# Patient Record
Sex: MALE
[De-identification: names, ages, dates, MRNs, and addresses within clinical notes are randomized per-mention and may not be internally consistent; named-entity substitution may affect disease eponyms.]

---

## 2021-09-30 ENCOUNTER — HOSPITAL ENCOUNTER (EMERGENCY)
Dept: HOSPITAL 5 - ED | Age: 51
LOS: 1 days | End: 2021-10-01
Payer: SELF-PAY

## 2021-09-30 VITALS — DIASTOLIC BLOOD PRESSURE: 37 MMHG | SYSTOLIC BLOOD PRESSURE: 86 MMHG

## 2021-09-30 DIAGNOSIS — N19: ICD-10-CM

## 2021-09-30 DIAGNOSIS — I46.9: ICD-10-CM

## 2021-09-30 DIAGNOSIS — D64.9: ICD-10-CM

## 2021-09-30 DIAGNOSIS — G93.40: Primary | ICD-10-CM

## 2021-09-30 DIAGNOSIS — N49.3: ICD-10-CM

## 2021-09-30 DIAGNOSIS — E87.2: ICD-10-CM

## 2021-09-30 DIAGNOSIS — J90: ICD-10-CM

## 2021-09-30 DIAGNOSIS — R65.20: ICD-10-CM

## 2021-09-30 DIAGNOSIS — K72.90: ICD-10-CM

## 2021-09-30 LAB
ALBUMIN SERPL-MCNC: 2.1 G/DL (ref 3.9–5)
ALT SERPL-CCNC: 69 UNITS/L (ref 7–56)
ANISOCYTOSIS BLD QL SMEAR: (no result)
APTT BLD: 55.9 SEC. (ref 24.2–36.6)
BAND NEUTROPHILS # (MANUAL): 0.3 K/MM3
BUN SERPL-MCNC: 75 MG/DL (ref 9–20)
BUN/CREAT SERPL: 23 %
CALCIUM SERPL-MCNC: 8.4 MG/DL (ref 8.4–10.2)
HCT VFR BLD CALC: 16.2 % (ref 35.5–45.6)
HEMOLYSIS INDEX: 0
HGB BLD-MCNC: 5.2 GM/DL (ref 11.8–15.2)
INR PPP: 3.51 (ref 0.87–1.13)
MACROCYTES BLD QL SMEAR: (no result)
MCHC RBC AUTO-ENTMCNC: 32 % (ref 32–34)
MCV RBC AUTO: 127 FL (ref 84–94)
MYELOCYTES # (MANUAL): 0 K/MM3
PLATELET # BLD: 186 K/MM3 (ref 140–440)
PROMYELOCYTES # (MANUAL): 0 K/MM3
RBC # BLD AUTO: 1.28 M/MM3 (ref 3.65–5.03)
TOTAL CELLS COUNTED BLD: 100

## 2021-09-30 PROCEDURE — 96365 THER/PROPH/DIAG IV INF INIT: CPT

## 2021-09-30 PROCEDURE — 85730 THROMBOPLASTIN TIME PARTIAL: CPT

## 2021-09-30 PROCEDURE — 36415 COLL VENOUS BLD VENIPUNCTURE: CPT

## 2021-09-30 PROCEDURE — 71045 X-RAY EXAM CHEST 1 VIEW: CPT

## 2021-09-30 PROCEDURE — 86901 BLOOD TYPING SEROLOGIC RH(D): CPT

## 2021-09-30 PROCEDURE — 96366 THER/PROPH/DIAG IV INF ADDON: CPT

## 2021-09-30 PROCEDURE — 92950 HEART/LUNG RESUSCITATION CPR: CPT

## 2021-09-30 PROCEDURE — 86850 RBC ANTIBODY SCREEN: CPT

## 2021-09-30 PROCEDURE — 36680 INSERT NEEDLE BONE CAVITY: CPT

## 2021-09-30 PROCEDURE — 99292 CRITICAL CARE ADDL 30 MIN: CPT

## 2021-09-30 PROCEDURE — 82140 ASSAY OF AMMONIA: CPT

## 2021-09-30 PROCEDURE — 86920 COMPATIBILITY TEST SPIN: CPT

## 2021-09-30 PROCEDURE — 86900 BLOOD TYPING SEROLOGIC ABO: CPT

## 2021-09-30 PROCEDURE — 70450 CT HEAD/BRAIN W/O DYE: CPT

## 2021-09-30 PROCEDURE — 99291 CRITICAL CARE FIRST HOUR: CPT

## 2021-09-30 PROCEDURE — 80053 COMPREHEN METABOLIC PANEL: CPT

## 2021-09-30 PROCEDURE — 85610 PROTHROMBIN TIME: CPT

## 2021-09-30 PROCEDURE — 96367 TX/PROPH/DG ADDL SEQ IV INF: CPT

## 2021-09-30 PROCEDURE — 87040 BLOOD CULTURE FOR BACTERIA: CPT

## 2021-09-30 PROCEDURE — 85007 BL SMEAR W/DIFF WBC COUNT: CPT

## 2021-09-30 PROCEDURE — 85025 COMPLETE CBC W/AUTO DIFF WBC: CPT

## 2021-09-30 PROCEDURE — 96368 THER/DIAG CONCURRENT INF: CPT

## 2021-09-30 NOTE — XRAY REPORT
CHEST 1 VIEW 9/30/2021 8:48 PM



INDICATION / CLINICAL INFORMATION: AMS.



COMPARISON: None available.



FINDINGS:



SUPPORT DEVICES: None.

HEART / MEDIASTINUM: No significant abnormality. 

LUNGS / PLEURA: There is a moderate right pleural effusion with right basilar opacification. No pneum
othorax. 



ADDITIONAL FINDINGS: No significant additional findings.



IMPRESSION:

1. Moderate right pleural effusion with right basilar opacification. Underlying infectious process is
 not excluded.



Signer Name: Mumtaz Garnett DO 

Signed: 9/30/2021 9:11 PM

Workstation Name: [id number]

## 2021-10-01 NOTE — CAT SCAN REPORT
CT HEAD WITHOUT CONTRAST



INDICATION: Altered Mental Status / Fall, cardiopulmonary arrest immediately following scan, patient 
now 



TECHNIQUE: All CT scans at this location are performed using CT dose reduction for ALARA by means of 
automated exposure control. 



COMPARISON: None available.



FINDINGS:



BRAIN: No hemorrhage or mass effect are seen. No evidence of acute infarction is noted. Mild atrophic
 changes are seen which are disproportionate to age.



ORBITS: Normal as visualized.

SOFT TISSUES OF HEAD: Normal.

CALVARIUM: Normal.

VISUALIZED PARANASAL SINUSES AND MASTOID AIR CELLS: Clear.



ADDITIONAL FINDINGS: None.



IMPRESSION: No acute intracranial abnormality.



Signer Name: Pedro Long MD 

Signed: 10/1/2021 12:41 AM

Workstation Name: [id number]

## 2021-10-01 NOTE — EMERGENCY DEPARTMENT REPORT
ED Altered Mental Status HPI





- General


Chief Complaint: Altered Mental Status


Stated Complaint: HERNIA


Time Seen by Provider: 21 20:41


Source: EMS


Mode of arrival: Stretcher


Limitations: Altered Mental Status





- History of Present Illness


Initial Comments: 





51-year-old male, unknown past medical history, presents to ED with altered 

mental status.  Apparently EMS was called by patient's friend for altered mental

status.  Only history that was given by the friend is that patient has a 

multiyear history of right inguinal hernia.  EMS reports patient was in unkempt 

conditions.  Patient hypotensive.  Patient is altered and unable to give any 

history.  


MD Complaint: altered mental status


-: unknown


Severity: severe


Consistency of Symptoms: constant


Treatments Prior to Arrival: IV fluid





- Related Data


                                    Allergies











Allergy/AdvReac Type Severity Reaction Status Date / Time


 


No Known Drug Allergies Allergy  Unknown Verified 21 20:56














ED Review of Systems


ROS: 


Stated complaint: HERNIA


Other details as noted in HPI





Comment: Unobtainable due to pts medical conditions





ED Past Medical Hx





- Past Medical History


Previous Medical History?: No





- Surgical History


Past Surgical History?: No





- Social History


Smoking Status: Unknown if ever smoked


Substance Use Type: None





ED Physical Exam





- General


Limitations: Altered Mental Status


General appearance: obtunded





- Head


Head exam: Present: atraumatic, normocephalic





- Eye


Eye exam: Present: PERRL, EOMI, scleral icterus





- ENT


ENT exam: Present: mucous membranes dry





- Neck


Neck exam: Present: normal inspection





- Respiratory


Respiratory exam: Present: normal lung sounds bilaterally, respiratory distress,

other (Tachypnea)





- Cardiovascular


Cardiovascular Exam: Present: normal rhythm, tachycardia





- GI/Abdominal


GI/Abdominal exam: Present: soft.  Absent: distended





- 


 exam: Present: other (Severe swelling to right scrotum, slightly larger than 

a football, with areas that appear to be gangrenous and purulent discharge 

present)





- Extremities Exam


Extremities exam: Present: other (Edema present to bilateral lower extremities, 

right greater than left)





- Neurological Exam


Neurological exam: Present: altered





- Psychiatric


Psychiatric exam: Present: flat affect





- Skin


Skin exam: Present: warm, dry, other (Severely jaundiced)





ED Course


                                   Vital Signs











  21





  20:50 20:55 21:02


 


Temperature  97.0 F L 


 


Pulse Rate 112 H  108 H


 


Respiratory 20  31 H





Rate   


 


Blood Pressure   


 


O2 Sat by Pulse   97





Oximetry   














  21





  21:07 21:16 21:30


 


Temperature   


 


Pulse Rate  103 H 104 H


 


Respiratory  26 H 32 H





Rate   


 


Blood Pressure  73/31 86/24


 


O2 Sat by Pulse 98 99 100





Oximetry   














  21





  21:45 22:00 22:15


 


Temperature   


 


Pulse Rate 104 H 105 H 102 H


 


Respiratory 28 H 31 H 32 H





Rate   


 


Blood Pressure 78/23 82/25 84/33


 


O2 Sat by Pulse 100 100 100





Oximetry   














  21





  22:30 22:46 23:00


 


Temperature   


 


Pulse Rate  97 H 90


 


Respiratory 35 H 34 H 28 H





Rate   


 


Blood Pressure 76/27 81/27 75/27


 


O2 Sat by Pulse 100 100 100





Oximetry   














  21





  23:16


 


Temperature 


 


Pulse Rate 92 H


 


Respiratory 28 H





Rate 


 


Blood Pressure 86/37


 


O2 Sat by Pulse 90





Oximetry 














- Reevaluation(s)


Reevaluation #1: 





21  23:22


Had extensive conversation with the brother Praneeth Stubbs and family friend 

Rick Celeste here in the ED. Rick is the friend that called 911 tonight.  They 

both report that patient had longstanding history of alcohol abuse.  It is also 

reported that patient was urged by the both of them to seek medical attention 

for his hernia and declining health, however patient refused.  Patient had not 

seen a physician in quite some time.  Spoke with them regarding patient's 

abnormal lab values.  Explained that patient is critically ill and will likely 

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 

Raul's gangrene.








Reevaluation #2: 





10/01/21 00:38


 00:32


Call to CT scanner for CODE BLUE.  Patient asystole, pulseless, apneic.  ACLS 

was initiated.  Patient was bagged throughout the code with bag-valve-mask.  O2 

sats 100% throughout.  Patient received 3 rounds of ACLS which included sodium 

bicarb x1 and epi x3.  Patient remained in asystole.  Ultimately unable to 

achieve ROSC.  Time of death was called at 00:14.








Reevaluation #3: 





10/01/21 00:53


I spoke w/ patient's brother over the phone to notify him of patient's death.








- Lab Data


Result diagrams: 


                                 21 20:59





                                 21 20:59


                                   Lab Results











  21 Range/Units





  20:59 20:59 20:59 


 


WBC  28.3 H    (4.5-11.0)  K/mm3


 


RBC  1.28 L    (3.65-5.03)  M/mm3


 


Hgb  5.2 L*    (11.8-15.2)  gm/dl


 


Hct  16.2 L*    (35.5-45.6)  %


 


MCV  127 H    (84-94)  fl


 


MCH  41 H    (28-32)  pg


 


MCHC  32    (32-34)  %


 


RDW  19.3 H    (13.2-15.2)  %


 


Plt Count  186    (140-440)  K/mm3


 


Lymph # (Auto)  Np    


 


Add Manual Diff  Complete    


 


Total Counted  100    


 


Seg Neuts % (Manual)  83.0 H    (40.0-70.0)  %


 


Band Neutrophils %  1.0    %


 


Lymphocytes % (Manual)  8.0 L    (13.4-35.0)  %


 


Monocytes % (Manual)  7.0    (0.0-7.3)  %


 


Metamyelocytes %  1.0    %


 


Nucleated RBC %  9.0 H    (0.0-0.9)  %


 


Seg Neutrophils # Man  25.6 H    (1.8-7.7)  K/mm3


 


Band Neutrophils #  0.3    K/mm3


 


Lymphocytes # (Manual)  2.5    (1.2-5.4)  K/mm3


 


Abs React Lymphs (Man)  0.0    K/mm3


 


Monocytes # (Manual)  2.2 H    (0.0-0.8)  K/mm3


 


Eosinophils # (Manual)  0.0    (0.0-0.4)  K/mm3


 


Basophils # (Manual)  0.0    (0.0-0.1)  K/mm3


 


Metamyelocytes #  0.3    K/mm3


 


Myelocytes #  0.0    K/mm3


 


Promyelocytes #  0.0    K/mm3


 


Blast Cells #  0.0    K/mm3


 


WBC Morphology  Not Reportable    


 


Hypersegmented Neuts  Not Reportable    


 


Hyposegmented Neuts  Not Reportable    


 


Hypogranular Neuts  Not Reportable    


 


Smudge Cells  Not Reportable    


 


Toxic Granulation  Not Reportable    


 


Toxic Vacuolation  Not Reportable    


 


Dohle Bodies  Not Reportable    


 


Pelger-Huet Anomaly  Not Reportable    


 


Indu Rods  Not Reportable    


 


Platelet Estimate  Consistent w auto    


 


Clumped Platelets  Not Reportable    


 


Plt Clumps, EDTA  Not Reportable    


 


Large Platelets  Not Reportable    


 


Giant Platelets  Not Reportable    


 


Platelet Satelliting  Not Reportable    


 


Plt Morphology Comment  Not Reportable    


 


RBC Morphology  Not Reportable    


 


Dimorphic RBCs  Not Reportable    


 


Polychromasia  Few    


 


Hypochromasia  Not Reportable    


 


Poikilocytosis  Not Reportable    


 


Anisocytosis  1+    


 


Microcytosis  Not Reportable    


 


Macrocytosis  2+    


 


Spherocytes  Not Reportable    


 


Pappenheimer Bodies  Not Reportable    


 


Sickle Cells  Not Reportable    


 


Target Cells  Not Reportable    


 


Tear Drop Cells  Not Reportable    


 


Ovalocytes  Not Reportable    


 


Helmet Cells  Not Reportable    


 


May-Alvarado Bodies  Not Reportable    


 


Cabot Rings  Not Reportable    


 


Ankita Cells  Not Reportable    


 


Bite Cells  Not Reportable    


 


Crenated Cell  Not Reportable    


 


Elliptocytes  Not Reportable    


 


Acanthocytes (Spur)  Not Reportable    


 


Rouleaux  Not Reportable    


 


Hemoglobin C Crystals  Not Reportable    


 


Schistocytes  Not Reportable    


 


Malaria parasites  Not Reportable    


 


Shan Bodies  Not Reportable    


 


Hem Pathologist Commnt  No    


 


PT   35.2 H   (12.2-14.9)  Sec.


 


INR   3.51 H   (0.87-1.13)  


 


APTT   55.9 H   (24.2-36.6)  Sec.


 


Sodium    134 L  (137-145)  mmol/L


 


Potassium    3.8  (3.6-5.0)  mmol/L


 


Chloride    93.5 L  ()  mmol/L


 


Carbon Dioxide    14 L  (22-30)  mmol/L


 


Anion Gap    30  mmol/L


 


BUN    75 H  (9-20)  mg/dL


 


Creatinine    3.3 H  (0.8-1.3)  mg/dL


 


Estimated GFR    20  ml/min


 


BUN/Creatinine Ratio    23  %


 


Glucose    88  ()  mg/dL


 


Lactic Acid     (0.7-2.0)  mmol/L


 


Calcium    8.4  (8.4-10.2)  mg/dL


 


Total Bilirubin    17.50 H  (0.1-1.2)  mg/dL


 


AST    168 H  (5-40)  units/L


 


ALT    69 H  (7-56)  units/L


 


Alkaline Phosphatase    86  ()  units/L


 


Ammonia     (25-60)  umol/L


 


Total Protein    6.2 L  (6.3-8.2)  g/dL


 


Albumin    2.1 L  (3.9-5)  g/dL


 


Albumin/Globulin Ratio    0.5  %


 


Blood Type     


 


Antibody Screen     


 


Crossmatch     














  21 Range/Units





  20:59 20:59 21:49 


 


WBC     (4.5-11.0)  K/mm3


 


RBC     (3.65-5.03)  M/mm3


 


Hgb     (11.8-15.2)  gm/dl


 


Hct     (35.5-45.6)  %


 


MCV     (84-94)  fl


 


MCH     (28-32)  pg


 


MCHC     (32-34)  %


 


RDW     (13.2-15.2)  %


 


Plt Count     (140-440)  K/mm3


 


Lymph # (Auto)     


 


Add Manual Diff     


 


Total Counted     


 


Seg Neuts % (Manual)     (40.0-70.0)  %


 


Band Neutrophils %     %


 


Lymphocytes % (Manual)     (13.4-35.0)  %


 


Monocytes % (Manual)     (0.0-7.3)  %


 


Metamyelocytes %     %


 


Nucleated RBC %     (0.0-0.9)  %


 


Seg Neutrophils # Man     (1.8-7.7)  K/mm3


 


Band Neutrophils #     K/mm3


 


Lymphocytes # (Manual)     (1.2-5.4)  K/mm3


 


Abs React Lymphs (Man)     K/mm3


 


Monocytes # (Manual)     (0.0-0.8)  K/mm3


 


Eosinophils # (Manual)     (0.0-0.4)  K/mm3


 


Basophils # (Manual)     (0.0-0.1)  K/mm3


 


Metamyelocytes #     K/mm3


 


Myelocytes #     K/mm3


 


Promyelocytes #     K/mm3


 


Blast Cells #     K/mm3


 


WBC Morphology     


 


Hypersegmented Neuts     


 


Hyposegmented Neuts     


 


Hypogranular Neuts     


 


Smudge Cells     


 


Toxic Granulation     


 


Toxic Vacuolation     


 


Dohle Bodies     


 


Pelger-Huet Anomaly     


 


Inud Rods     


 


Platelet Estimate     


 


Clumped Platelets     


 


Plt Clumps, EDTA     


 


Large Platelets     


 


Giant Platelets     


 


Platelet Satelliting     


 


Plt Morphology Comment     


 


RBC Morphology     


 


Dimorphic RBCs     


 


Polychromasia     


 


Hypochromasia     


 


Poikilocytosis     


 


Anisocytosis     


 


Microcytosis     


 


Macrocytosis     


 


Spherocytes     


 


Pappenheimer Bodies     


 


Sickle Cells     


 


Target Cells     


 


Tear Drop Cells     


 


Ovalocytes     


 


Helmet Cells     


 


May-Alvarado Bodies     


 


Cabot Rings     


 


Ankita Cells     


 


Bite Cells     


 


Crenated Cell     


 


Elliptocytes     


 


Acanthocytes (Spur)     


 


Rouleaux     


 


Hemoglobin C Crystals     


 


Schistocytes     


 


Malaria parasites     


 


Shan Bodies     


 


Hem Pathologist Commnt     


 


PT     (12.2-14.9)  Sec.


 


INR     (0.87-1.13)  


 


APTT     (24.2-36.6)  Sec.


 


Sodium     (137-145)  mmol/L


 


Potassium     (3.6-5.0)  mmol/L


 


Chloride     ()  mmol/L


 


Carbon Dioxide     (22-30)  mmol/L


 


Anion Gap     mmol/L


 


BUN     (9-20)  mg/dL


 


Creatinine     (0.8-1.3)  mg/dL


 


Estimated GFR     ml/min


 


BUN/Creatinine Ratio     %


 


Glucose     ()  mg/dL


 


Lactic Acid  11.00 H*    (0.7-2.0)  mmol/L


 


Calcium     (8.4-10.2)  mg/dL


 


Total Bilirubin     (0.1-1.2)  mg/dL


 


AST     (5-40)  units/L


 


ALT     (7-56)  units/L


 


Alkaline Phosphatase     ()  units/L


 


Ammonia   84.0 H   (25-60)  umol/L


 


Total Protein     (6.3-8.2)  g/dL


 


Albumin     (3.9-5)  g/dL


 


Albumin/Globulin Ratio     %


 


Blood Type    A POSITIVE  


 


Antibody Screen    Negative  


 


Crossmatch    See Detail  














  21 Range/Units





  23:34 


 


WBC   (4.5-11.0)  K/mm3


 


RBC   (3.65-5.03)  M/mm3


 


Hgb   (11.8-15.2)  gm/dl


 


Hct   (35.5-45.6)  %


 


MCV   (84-94)  fl


 


MCH   (28-32)  pg


 


MCHC   (32-34)  %


 


RDW   (13.2-15.2)  %


 


Plt Count   (140-440)  K/mm3


 


Lymph # (Auto)   


 


Add Manual Diff   


 


Total Counted   


 


Seg Neuts % (Manual)   (40.0-70.0)  %


 


Band Neutrophils %   %


 


Lymphocytes % (Manual)   (13.4-35.0)  %


 


Monocytes % (Manual)   (0.0-7.3)  %


 


Metamyelocytes %   %


 


Nucleated RBC %   (0.0-0.9)  %


 


Seg Neutrophils # Man   (1.8-7.7)  K/mm3


 


Band Neutrophils #   K/mm3


 


Lymphocytes # (Manual)   (1.2-5.4)  K/mm3


 


Abs React Lymphs (Man)   K/mm3


 


Monocytes # (Manual)   (0.0-0.8)  K/mm3


 


Eosinophils # (Manual)   (0.0-0.4)  K/mm3


 


Basophils # (Manual)   (0.0-0.1)  K/mm3


 


Metamyelocytes #   K/mm3


 


Myelocytes #   K/mm3


 


Promyelocytes #   K/mm3


 


Blast Cells #   K/mm3


 


WBC Morphology   


 


Hypersegmented Neuts   


 


Hyposegmented Neuts   


 


Hypogranular Neuts   


 


Smudge Cells   


 


Toxic Granulation   


 


Toxic Vacuolation   


 


Dohle Bodies   


 


Pelger-Huet Anomaly   


 


Indu Rods   


 


Platelet Estimate   


 


Clumped Platelets   


 


Plt Clumps, EDTA   


 


Large Platelets   


 


Giant Platelets   


 


Platelet Satelliting   


 


Plt Morphology Comment   


 


RBC Morphology   


 


Dimorphic RBCs   


 


Polychromasia   


 


Hypochromasia   


 


Poikilocytosis   


 


Anisocytosis   


 


Microcytosis   


 


Macrocytosis   


 


Spherocytes   


 


Pappenheimer Bodies   


 


Sickle Cells   


 


Target Cells   


 


Tear Drop Cells   


 


Ovalocytes   


 


Helmet Cells   


 


May-Alvarado Bodies   


 


Cabot Rings   


 


Ankita Cells   


 


Bite Cells   


 


Crenated Cell   


 


Elliptocytes   


 


Acanthocytes (Spur)   


 


Rouleaux   


 


Hemoglobin C Crystals   


 


Schistocytes   


 


Malaria parasites   


 


Shan Bodies   


 


Hem Pathologist Commnt   


 


PT   (12.2-14.9)  Sec.


 


INR   (0.87-1.13)  


 


APTT   (24.2-36.6)  Sec.


 


Sodium   (137-145)  mmol/L


 


Potassium   (3.6-5.0)  mmol/L


 


Chloride   ()  mmol/L


 


Carbon Dioxide   (22-30)  mmol/L


 


Anion Gap   mmol/L


 


BUN   (9-20)  mg/dL


 


Creatinine   (0.8-1.3)  mg/dL


 


Estimated GFR   ml/min


 


BUN/Creatinine Ratio   %


 


Glucose   ()  mg/dL


 


Lactic Acid  11.20 H*  (0.7-2.0)  mmol/L


 


Calcium   (8.4-10.2)  mg/dL


 


Total Bilirubin   (0.1-1.2)  mg/dL


 


AST   (5-40)  units/L


 


ALT   (7-56)  units/L


 


Alkaline Phosphatase   ()  units/L


 


Ammonia   (25-60)  umol/L


 


Total Protein   (6.3-8.2)  g/dL


 


Albumin   (3.9-5)  g/dL


 


Albumin/Globulin Ratio   %


 


Blood Type   


 


Antibody Screen   


 


Crossmatch   














- Radiology Data


Radiology results: report reviewed, image reviewed





- Medical Decision Making





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 

Raul's gangrene.  Patient hypotensive.  Due to his Raul's gangrene and 

avoided placing a femoral central line.  Due to patient's coagulopathy avoided 

placing an IJ central line.  IO was placed for medication administration.  

Patient was given IV fluids 30 cc/kg, along with vancomycin and Zosyn.  Patient 

multiple lab abnormalities including coagulopathy, liver failure, renal failure,

anemia, metabolic acidosis, elevated white count.  CODE BLUE was called while 

patient was in the CT scanner.  Code was run according to ACLS protocols, 

however, we were unable to obtain ROSC.  I had previously spoken with the 

patient's brother and family friend regarding how critical patient was and 

relayed that death could possibly be imminent.  Following the code, I called 

patient's brother to inform him of patient's death.





- Differential Diagnosis


Raul's gangrene, sepsis, liver cirrhosis


Critical Care Time: Yes


Critical care time in (mins) excluding proc time.: 80


Critical care attestation.: 


If time is entered above; I have spent that time in minutes in the direct care 

of this critically ill patient, excluding procedure time.





Critical Care Time: 





80 min





ED Disposition


Clinical Impression: 


 Acute encephalopathy, Severe sepsis, Liver failure, Anemia, Coagulopathy, 

Metabolic acidosis, Acute renal failure, Raul's gangrene, Cardiac arrest, 

Pleural effusion





Disposition: 20 


Is pt being admited?: No


Condition: Stable


Time of Disposition: 00:42